# Patient Record
Sex: FEMALE | Race: OTHER | HISPANIC OR LATINO
[De-identification: names, ages, dates, MRNs, and addresses within clinical notes are randomized per-mention and may not be internally consistent; named-entity substitution may affect disease eponyms.]

---

## 2021-12-13 PROBLEM — Z00.00 ENCOUNTER FOR PREVENTIVE HEALTH EXAMINATION: Status: ACTIVE | Noted: 2021-12-13

## 2021-12-20 ENCOUNTER — APPOINTMENT (OUTPATIENT)
Dept: PULMONOLOGY | Facility: CLINIC | Age: 41
End: 2021-12-20
Payer: COMMERCIAL

## 2021-12-20 VITALS
BODY MASS INDEX: 49.67 KG/M2 | DIASTOLIC BLOOD PRESSURE: 80 MMHG | OXYGEN SATURATION: 97 % | HEART RATE: 93 BPM | WEIGHT: 253 LBS | HEIGHT: 60 IN | SYSTOLIC BLOOD PRESSURE: 112 MMHG | TEMPERATURE: 98.6 F

## 2021-12-20 VITALS
TEMPERATURE: 98.1 F | WEIGHT: 253 LBS | BODY MASS INDEX: 49.67 KG/M2 | RESPIRATION RATE: 16 BRPM | HEIGHT: 60 IN | HEART RATE: 88 BPM | DIASTOLIC BLOOD PRESSURE: 74 MMHG | SYSTOLIC BLOOD PRESSURE: 108 MMHG

## 2021-12-20 DIAGNOSIS — E66.01 MORBID (SEVERE) OBESITY DUE TO EXCESS CALORIES: ICD-10-CM

## 2021-12-20 DIAGNOSIS — E78.00 PURE HYPERCHOLESTEROLEMIA, UNSPECIFIED: ICD-10-CM

## 2021-12-20 DIAGNOSIS — R09.82 POSTNASAL DRIP: ICD-10-CM

## 2021-12-20 DIAGNOSIS — Z78.9 OTHER SPECIFIED HEALTH STATUS: ICD-10-CM

## 2021-12-20 DIAGNOSIS — I10 ESSENTIAL (PRIMARY) HYPERTENSION: ICD-10-CM

## 2021-12-20 DIAGNOSIS — R06.83 SNORING: ICD-10-CM

## 2021-12-20 DIAGNOSIS — R05.9 COUGH, UNSPECIFIED: ICD-10-CM

## 2021-12-20 DIAGNOSIS — E11.9 TYPE 2 DIABETES MELLITUS W/OUT COMPLICATIONS: ICD-10-CM

## 2021-12-20 DIAGNOSIS — R06.00 DYSPNEA, UNSPECIFIED: ICD-10-CM

## 2021-12-20 PROCEDURE — 99205 OFFICE O/P NEW HI 60 MIN: CPT | Mod: 25

## 2021-12-20 PROCEDURE — 94729 DIFFUSING CAPACITY: CPT

## 2021-12-20 PROCEDURE — 94010 BREATHING CAPACITY TEST: CPT

## 2021-12-20 PROCEDURE — 94726 PLETHYSMOGRAPHY LUNG VOLUMES: CPT

## 2021-12-20 PROCEDURE — ZZZZZ: CPT

## 2021-12-20 RX ORDER — INSULIN HUMAN 500 [IU]/ML
500 INJECTION, SOLUTION SUBCUTANEOUS
Qty: 20 | Refills: 0 | Status: ACTIVE | COMMUNITY
Start: 2021-11-08

## 2021-12-20 RX ORDER — LANCETS 33 GAUGE
EACH MISCELLANEOUS
Qty: 100 | Refills: 0 | Status: ACTIVE | COMMUNITY
Start: 2021-11-08

## 2021-12-20 RX ORDER — GABAPENTIN 300 MG/1
300 CAPSULE ORAL
Qty: 30 | Refills: 0 | Status: ACTIVE | COMMUNITY
Start: 2021-11-01

## 2021-12-20 RX ORDER — SUCRALFATE 1 G/1
1 TABLET ORAL
Qty: 60 | Refills: 0 | Status: COMPLETED | COMMUNITY
Start: 2021-08-09

## 2021-12-20 RX ORDER — SYRINGE,INSUL U-500,NDL,0.5ML 31GX15/64"
31G X 6MM SYRINGE, EMPTY DISPOSABLE MISCELLANEOUS
Qty: 100 | Refills: 0 | Status: ACTIVE | COMMUNITY
Start: 2021-11-08

## 2021-12-20 RX ORDER — IBUPROFEN 600 MG/1
600 TABLET ORAL
Qty: 90 | Refills: 0 | Status: DISCONTINUED | COMMUNITY
Start: 2021-08-30

## 2021-12-20 RX ORDER — EMPAGLIFLOZIN 25 MG/1
25 TABLET, FILM COATED ORAL
Qty: 30 | Refills: 0 | Status: ACTIVE | COMMUNITY
Start: 2021-11-08

## 2021-12-20 RX ORDER — BLOOD SUGAR DIAGNOSTIC
STRIP MISCELLANEOUS
Qty: 100 | Refills: 0 | Status: ACTIVE | COMMUNITY
Start: 2021-11-08

## 2021-12-20 RX ORDER — FLUTICASONE PROPIONATE AND SALMETEROL 55; 14 UG/1; UG/1
55-14 POWDER, METERED RESPIRATORY (INHALATION)
Qty: 1 | Refills: 0 | Status: ACTIVE | COMMUNITY
Start: 2021-11-03

## 2021-12-20 RX ORDER — HYDROCHLOROTHIAZIDE 12.5 MG/1
12.5 CAPSULE ORAL
Qty: 30 | Refills: 0 | Status: DISCONTINUED | COMMUNITY
Start: 2021-08-09

## 2021-12-20 RX ORDER — AMOXICILLIN 500 MG/1
500 CAPSULE ORAL
Qty: 56 | Refills: 0 | Status: COMPLETED | COMMUNITY
Start: 2021-08-10

## 2021-12-20 RX ORDER — ALBUTEROL SULFATE 90 UG/1
108 (90 BASE) INHALANT RESPIRATORY (INHALATION)
Qty: 18 | Refills: 0 | Status: ACTIVE | COMMUNITY
Start: 2021-07-19

## 2021-12-20 RX ORDER — FLUOXETINE HYDROCHLORIDE 10 MG/1
10 CAPSULE ORAL
Qty: 30 | Refills: 0 | Status: ACTIVE | COMMUNITY
Start: 2021-12-14

## 2021-12-20 RX ORDER — FAMOTIDINE 20 MG/1
20 TABLET, FILM COATED ORAL
Qty: 60 | Refills: 0 | Status: ACTIVE | COMMUNITY
Start: 2021-08-30

## 2021-12-20 RX ORDER — METFORMIN ER 500 MG 500 MG/1
500 TABLET ORAL
Qty: 120 | Refills: 0 | Status: DISCONTINUED | COMMUNITY
Start: 2021-11-08

## 2021-12-20 RX ORDER — BLOOD-GLUCOSE METER
W/DEVICE EACH MISCELLANEOUS
Qty: 1 | Refills: 0 | Status: ACTIVE | COMMUNITY
Start: 2021-09-24

## 2021-12-20 RX ORDER — SIMVASTATIN 20 MG/1
20 TABLET, FILM COATED ORAL
Refills: 0 | Status: ACTIVE | COMMUNITY
Start: 2021-12-20

## 2021-12-20 RX ORDER — LISINOPRIL 2.5 MG/1
2.5 TABLET ORAL
Qty: 30 | Refills: 0 | Status: ACTIVE | COMMUNITY
Start: 2021-11-01

## 2021-12-20 RX ORDER — FLUTICASONE PROPIONATE 50 UG/1
50 SPRAY, METERED NASAL TWICE DAILY
Qty: 1 | Refills: 3 | Status: ACTIVE | COMMUNITY
Start: 2021-12-20 | End: 1900-01-01

## 2021-12-20 RX ORDER — MONTELUKAST 10 MG/1
10 TABLET, FILM COATED ORAL
Qty: 30 | Refills: 0 | Status: ACTIVE | COMMUNITY
Start: 2021-11-01

## 2021-12-20 RX ORDER — CLARITHROMYCIN 500 MG/1
500 TABLET, FILM COATED ORAL
Qty: 28 | Refills: 0 | Status: COMPLETED | COMMUNITY
Start: 2021-08-10

## 2021-12-20 RX ORDER — OMEPRAZOLE 40 MG/1
40 CAPSULE, DELAYED RELEASE ORAL
Qty: 28 | Refills: 0 | Status: DISCONTINUED | COMMUNITY
Start: 2021-08-10

## 2021-12-20 NOTE — ASSESSMENT
[Obesity, Class III, BMI 40-49.9 (E66.01)] : macrophage activation syndrome [FreeTextEntry1] : 41-year-old female with significant past medical history of diabetes, hypertension, obesity who comes in for several complaints.\par \par #Dyspnea on exertion–patient has dyspnea on exertion since gaining about  pounds with the start of pandemic.  She had pulmonary function test prior to coming in which showed normal spirometry and normal diffusion capacity.  Though she had normal lung volumes her ERV was diminished.  This likely indicates that the patient has a form of deconditioning secondary to her morbid obesity.  She is also on several inhaler medications for presumed asthma which there is a significant link to patients who have morbid obesity.  She is educated on how to take her medications on a daily regimen and when to use albuterol inhaler.\par \par #Postnasal drip–patient complains of cough associated with allergies.  Also complains of an abnormal sensation in the back portion of her throat which may or may not have sputum production.  Given her history of allergies there is a high likelihood that the patient is suffering from postnasal drip.  She has never tried nasal corticosteroids.  We will give her a prescription of Flonase to be taken after she showers on a daily basis.  She is educated that this may take 1 to 2 months in order to resolve.\par \par #Sleep disordered breathing–patient describes signs and symptoms of possible sleep disordered breathing including loud snoring, witnessed apnea, nonrestorative sleep, excessive daytime sleepiness, early morning dry mouth, and early morning headache.  Patient also describes a choking and gasping sensation in the middle of the night.  Given her Miranda palate score of 4 and her obesity there is a high likelihood for obstructive sleep apnea.  She is ordered home diagnostic sleep study for further evaluation of possible sleep disordered breathing.\par \par She will follow up with me after her home diagnostic sleep study.

## 2021-12-20 NOTE — PHYSICAL EXAM
[No Acute Distress] : no acute distress [Well Nourished] : well nourished [No Deformities] : no deformities [Enlarged Base of the Tongue] : enlarged base of the tongue [IV] : Mallampati Class: IV [Normal Appearance] : normal appearance [No Neck Mass] : no neck mass [Normal Rate/Rhythm] : normal rate/rhythm [Normal S1, S2] : normal s1, s2 [No Murmurs] : no murmurs [No Resp Distress] : no resp distress [Normal Rhythm and Effort] : normal rhythm and effort [Clear to Auscultation Bilaterally] : clear to auscultation bilaterally [No Clubbing] : no clubbing [No Cyanosis] : no cyanosis [No Edema] : no edema [No Focal Deficits] : no focal deficits [Oriented x3] : oriented x3 [Normal Mood] : normal mood [Normal Affect] : normal affect

## 2021-12-20 NOTE — REASON FOR VISIT
[Initial] : an initial visit [Sleep Evaluation] : sleep evaluation [Shortness of Breath] : shortness of breath

## 2021-12-20 NOTE — HISTORY OF PRESENT ILLNESS
[Never] : never [Awakes Unrefreshed] : awakes unrefreshed [Awakes with Dry Mouth] : awakes with dry mouth [Awakes with Headache] : awakes with headache [Daytime Somnolence] : daytime somnolence [Difficulty Initiating Sleep] : difficulty initiating sleep [Hypersomnolence] : hypersomnolence [Nonrestorative Sleep] : nonrestorative sleep [Recent  Weight Gain] : recent weight gain [Snoring] : snoring [Witnessed Apneas] : witnessed apneas [DIS] : difficulty initiating sleep [TextBox_4] : This is a 41-year-old female with significant past medical history of hypertension, diabetes, and obesity who comes in for an evaluation of multiple complaints.\par \par Patient indicates that her primary concern is dyspnea on exertion.  She indicates that she has been having some shortness of breath with going up the stairs and walking long distances.  This is occurred predominantly after the pandemic started.  Indicates that she has gained about  pounds since the pandemic.  She is notices been more difficult moving around since the weight gain.  She also indicates that she has a cough that occurs at varying times throughout the year.  Also indicates that there is a abnormal sensation in her throat during this time and when she coughs at times there might be sputum production.  She does admit to significant amount of allergies for which she is seeing an allergist for and was given montelukast.\par \par The patient also complains of gasping for air in the middle of the night and choking sensation which causes some nocturnal awakenings.  Indicates that this has been ongoing for some time but gotten worse since she is gained weight.  She has excessive daytime sleepiness, loud snoring, possible witnessed apnea, nonrestorative sleep, early morning dry mouth, and at times morning headaches.\par \par _______________________________________________________________________________\par \par Subraye qué tan frecuentemente se queda dormido Ud. en cada kindra de las siguientes\par situaciones (kam el día) :\par  0 = nunca 1 = solo algunas veces 2 = muchas veces 3 = diego siempre\par \par 2------Sentado leyendo:\par 3------Viendo la televisión:\par 1------Sentado, inactivo en un lugar público:\par 2------Montrose pasajero en un viaje de kindra hora (o más) sin paradas:\par 2------Acostado descansando por la tarde:\par 0------Sentado platicando con alguien:\par 2------Sentado cómodamente después de comer, sin sosa tomado bebidas alcohólicas:\par 0------Viajando en un transporte detenido en el tráfico:\par 12------Total\par \par [place] Version of the [place] Sleepiness Scale The [employer] Sleep Journal, 2009, Volume 2 7\par \par ________________________________________________________________________________\par \par  [Difficulty Maintaining Sleep] : does not have difficulty maintaining sleep [Frequent Nocturnal Awakening] : denies frequent nocturnal awakening [DMS] : does not have difficulty maintaining sleep [ESS] : 12

## 2021-12-20 NOTE — REVIEW OF SYSTEMS
[Recent Wt Gain (___ Lbs)] : ~T recent [unfilled] lb weight gain [Sore Throat] : sore throat [Postnasal Drip] : postnasal drip [Dry Mouth] : dry mouth [Sinus Problems] : sinus problems [Cough] : cough [Chest Tightness] : chest tightness [A.M. Dry Mouth] : a.m. dry mouth [SOB on Exertion] : sob on exertion [Seasonal Allergies] : seasonal allergies [Nasal Discharge] : nasal discharge [Nocturia] : nocturia [Anxiety] : anxiety [Diabetes] : diabetes [Obesity] : obesity [Negative] : Neurologic [Sputum] : no sputum

## 2022-02-07 ENCOUNTER — APPOINTMENT (OUTPATIENT)
Dept: SLEEP CENTER | Facility: CLINIC | Age: 42
End: 2022-02-07

## 2024-12-18 ENCOUNTER — APPOINTMENT (OUTPATIENT)
Dept: ENDOCRINOLOGY | Facility: CLINIC | Age: 44
End: 2024-12-18